# Patient Record
Sex: MALE | Race: WHITE | NOT HISPANIC OR LATINO | Employment: FULL TIME | URBAN - METROPOLITAN AREA
[De-identification: names, ages, dates, MRNs, and addresses within clinical notes are randomized per-mention and may not be internally consistent; named-entity substitution may affect disease eponyms.]

---

## 2017-09-08 ENCOUNTER — ALLSCRIPTS OFFICE VISIT (OUTPATIENT)
Dept: OTHER | Facility: OTHER | Age: 67
End: 2017-09-08

## 2017-09-08 ENCOUNTER — APPOINTMENT (OUTPATIENT)
Dept: LAB | Facility: CLINIC | Age: 67
End: 2017-09-08
Payer: MEDICARE

## 2017-09-08 DIAGNOSIS — Z13.1 ENCOUNTER FOR SCREENING FOR DIABETES MELLITUS: ICD-10-CM

## 2017-09-08 DIAGNOSIS — Z13.6 ENCOUNTER FOR SCREENING FOR CARDIOVASCULAR DISORDERS: ICD-10-CM

## 2017-09-08 DIAGNOSIS — J30.9 ALLERGIC RHINITIS: ICD-10-CM

## 2017-09-08 LAB
CHOLEST SERPL-MCNC: 230 MG/DL (ref 50–200)
GLUCOSE P FAST SERPL-MCNC: 88 MG/DL (ref 65–99)
HDLC SERPL-MCNC: 32 MG/DL (ref 40–60)
LDLC SERPL CALC-MCNC: 160 MG/DL (ref 0–100)
TRIGL SERPL-MCNC: 189 MG/DL

## 2017-09-08 PROCEDURE — 86003 ALLG SPEC IGE CRUDE XTRC EA: CPT

## 2017-09-08 PROCEDURE — 82947 ASSAY GLUCOSE BLOOD QUANT: CPT

## 2017-09-08 PROCEDURE — 36415 COLL VENOUS BLD VENIPUNCTURE: CPT

## 2017-09-08 PROCEDURE — 80061 LIPID PANEL: CPT

## 2017-09-08 PROCEDURE — 82785 ASSAY OF IGE: CPT

## 2017-09-10 ENCOUNTER — GENERIC CONVERSION - ENCOUNTER (OUTPATIENT)
Dept: OTHER | Facility: OTHER | Age: 67
End: 2017-09-10

## 2017-09-11 LAB
A ALTERNATA IGE QN: 1.25 KUA/I
A FUMIGATUS IGE QN: <0.1 KUA/I
ALLERGEN COMMENT: ABNORMAL
ALLERGEN COMMENT: ABNORMAL
ALMOND IGE QN: <0.1 KUA/I
BERMUDA GRASS IGE QN: <0.1 KUA/I
BOXELDER IGE QN: <0.1 KUA/I
C HERBARUM IGE QN: <0.1 KUA/I
CASHEW NUT IGE QN: <0.1 KUA/I
CAT DANDER IGE QN: 1.24 KUA/I
CMN PIGWEED IGE QN: <0.1 KUA/I
CODFISH IGE QN: <0.1 KUA/I
COMMON RAGWEED IGE QN: 1 KUA/I
COTTONWOOD IGE QN: <0.1 KUA/I
D FARINAE IGE QN: 1.18 KUA/I
D PTERONYSS IGE QN: 1.49 KUA/I
DOG DANDER IGE QN: 0.43 KUA/I
EGG WHITE IGE QN: <0.1 KUA/I
GLUTEN IGE QN: <0.1 KUA/I
HAZELNUT IGE QN: <0.1 KUA/L
LONDON PLANE IGE QN: <0.1 KUA/I
MILK IGE QN: <0.1 KUA/I
MOUSE URINE PROT IGE QN: <0.1 KUA/I
MT JUNIPER IGE QN: <0.1 KUA/I
MUGWORT IGE QN: <0.1 KUA/I
P NOTATUM IGE QN: <0.1 KUA/I
PEANUT IGE QN: <0.1 KUA/I
ROACH IGE QN: 0.38 KUA/I
SALMON IGE QN: <0.1 KUA/I
SCALLOP IGE QN: <0.1 KUA/L
SESAME SEED IGE QN: 0.13 KUA/I
SHEEP SORREL IGE QN: <0.1 KUA/I
SHRIMP IGE QN: 0.39 KUA/L
SILVER BIRCH IGE QN: <0.1 KUA/I
SOYBEAN IGE QN: <0.1 KUA/I
TIMOTHY IGE QN: 0.86 KUA/I
TOTAL IGE SMQN RAST: 115 KU/L (ref 0–113)
TOTAL IGE SMQN RAST: 115 KU/L (ref 0–113)
TUNA IGE QN: <0.1 KUA/I
WALNUT IGE QN: 0.15 KUA/I
WALNUT IGE QN: <0.1 KUA/I
WHEAT IGE QN: <0.1 KUA/I
WHITE ASH IGE QN: <0.1 KUA/I
WHITE ELM IGE QN: <0.1 KUA/I
WHITE MULBERRY IGE QN: <0.1 KUA/I
WHITE OAK IGE QN: <0.1 KUA/I

## 2017-09-26 ENCOUNTER — ALLSCRIPTS OFFICE VISIT (OUTPATIENT)
Dept: OTHER | Facility: OTHER | Age: 67
End: 2017-09-26

## 2018-01-14 VITALS
WEIGHT: 225 LBS | OXYGEN SATURATION: 97 % | DIASTOLIC BLOOD PRESSURE: 70 MMHG | HEART RATE: 81 BPM | BODY MASS INDEX: 32.21 KG/M2 | HEIGHT: 70 IN | SYSTOLIC BLOOD PRESSURE: 116 MMHG

## 2018-01-14 VITALS
BODY MASS INDEX: 32.21 KG/M2 | OXYGEN SATURATION: 94 % | HEART RATE: 66 BPM | SYSTOLIC BLOOD PRESSURE: 140 MMHG | HEIGHT: 70 IN | WEIGHT: 225 LBS | DIASTOLIC BLOOD PRESSURE: 82 MMHG

## 2018-01-15 NOTE — PROGRESS NOTES
Assessment    1  Medicare annual wellness visit, initial (V70 0) (Z00 00)   2  Hyperlipidemia (272 4) (E78 5)    Plan  Health Maintenance    · *VB - Urinary Incontinence Screen (Dx Z13 89 Screen for UI); Status:Complete -  Retrospective By Protocol Authorization;   Done: [id number] 08:42AM  Hyperlipidemia    · (1) LIPID PANEL, FASTING; Status:Active; Requested for:26Mar2018;    · Follow-up visit in 6 months Evaluation and Treatment  Follow-up Dr Vince Byrd  Status: Ankita Whyte for: [id number]   · Eat a low fat and low cholesterol diet ; Status:Complete;   Done: 54FYH1855  Medicare annual wellness visit, initial    · Follow-up visit in 1 year Evaluation and Treatment  Follow-up  Status: Hold For -  Scheduling  Requested for: [id number]  Screening for malignant neoplasm of prostate    · (1) PSA (SCREEN) (Dx V76 44 Screen for Prostate Cancer); Status:Active; Requested  for:26Mar2018; Discussion/Summary    5 Wishes given to Pt to review  Advised use of Nasacort 1 spray each nostril twice daily  Can use Airborne or Zycam daily  Impression: Initial Annual Wellness Visit  Cardiovascular screening and counseling: the risks and benefits of screening were discussed and screening is current  Diabetes screening and counseling: the risks and benefits of screening were discussed and screening is current  Colorectal cancer screening and counseling: the risks and benefits of screening were discussed and due for a colonoscopy (low risk)  Prostate cancer screening and counseling: the risks and benefits of screening were discussed and screening is current  Osteoporosis screening and counseling: the risks and benefits of screening were discussed  Glaucoma screening and counseling: the risks and benefits of screening were discussed and screening is current   Immunizations: the risks and benefits of influenza vaccination were discussed with the patient, influenza vaccination is recommended annually, the risks and benefits of the Zostavax vaccine were discussed with the patient and the risks and benefits of the Td vaccine were discussed with the patient  Advance Directive Planning: paperwork and instructions were given to the patient  Patient Discussion: plan discussed with the patient, follow-up visit needed in one year  Self Referrals: No      History of Present Illness  48-year-old white male presents for his first Medicare annual wellness visit  She has been reviewed, clarified, and updated with the patient  She does have some allergy concerns with itchy watery eyes and runny nose  Labs done at his last visit were reviewed with him today  He does have significant allergies from the environment which are out now  He also has hyperlipidemia  We discussed low-cholesterol diet, and modifying some of the foods that he is presently eating  He would like to try for the next 6 months modifying his diet and repeating his labs before considering medication  The patient is being seen for the initial annual wellness visit  Medicare Screening and Risk Factors   Hospitalizations: no previous hospitalizations  Medicare Screening Tests Risk Questions   Drug and Alcohol Use: The patient is a current cigarette smoker  The patient reports never drinking alcohol  Alcohol concern:   The patient has no concerns about alcohol abuse  He has never used illicit drugs  Diet and Physical Activity: Current diet includes 1 servings of fruit per day and 1 servings of meat per day  The patient does not exercise  Mood Disorder and Cognitive Impairment Screening:   Cognitive impairment screening: denies difficulty learning/retaining new information, denies difficulty handling complex tasks, denies difficulty with reasoning, denies difficulty with spatial ability and orientation, denies difficulty with language and denies difficulty with behavior  Functional Ability/Level of Safety: He denies hearing difficulties   Activities of daily living details: meal preparation help needed and needs help doing laundry, but does not need help using the phone, no transportation help needed, does not need help shopping, does not need help managing medications and does not need help managing money  Fall risk factors: The patient fell 0 times in the past 12 months  Home safety risk factors:  no handrails on the stairs, but no unfamiliar surroundings, no loose rugs, no poor household lighting, no uneven floors, no household clutter and grab bars in the bathroom  Advance Directives: Advance directives: no living will, no durable power of  for health care directives and no advance directives  end of life decisions were reviewed with the patient  Co-Managers and Medical Equipment/Suppliers: See Patient Care Team   Falls Risk: The patient fell times in the past 12 months  The patient is currently asymptomatic Symptoms Include: The patient currently has no urinary incontinence symptoms  Review of Systems    Constitutional: no fever, no chills, no malaise and no fatigue  Head and Face: no facial pain and no facial pressure  Eyes: watery discharge from the eyes and itching of the eyes  ENT: nasal congestion, nasal discharge and sneezing, but no sore throat, no scratchy throat, no hoarseness, no earache, no hearing loss and no white patches in the mouth  Cardiovascular: negative  Respiratory: negative  Gastrointestinal: no abdominal pain, no nausea, no vomiting, no diarrhea, no constipation and no bright red blood per rectum  Genitourinary: no dysuria, no urinary frequency, no urinary urgency, no urinary incontinence and no urinary hesitancy  Musculoskeletal: joint stiffness, but no diffuse joint pain and no generalized muscle aches  Integumentary and Breasts: no rashes  Neurological: no headache, no dizziness and no fainting     Psychiatric: no anxiety, no depression and not suicidal    Hematologic and Lymphatic: negative  Active Problems    1  Allergic rhinitis (477 9) (J30 9)   2  Colon cancer screening (V76 51) (Z12 11)   3  Screening for cardiovascular condition (V81 2) (Z13 6)   4  Screening for diabetes mellitus (V77 1) (Z13 1)   5  Tobacco abuse (305 1) (Z72 0)    Past Medical History    1  Denied: History of mental disorder    The active problems and past medical history were reviewed and updated today  Surgical History    1  History of Gallbladder Surgery    The surgical history was reviewed and updated today  Family History  Mother    1  Family history of malignant neoplasm of breast (V16 3) (Z80 3)  Father    2  Family history of emphysema (V17 6) (Z82 5)  Family History    3  Denied: Family history of mental disorder   4  Denied: Family history of substance abuse    The family history was reviewed and updated today  Social History    · Current every day smoker (305 1) (F17 200)   · Does not use illicit drugs (Z81 35) (Z78 9)   · Full-time employment   · Denied: History of High risk sexual behavior   ·    · No alcohol use  The social history was reviewed and updated today  The social history was reviewed and is unchanged  Current Meds   1  Claritin TABS; TAKE 1 TABLET DAILY; Therapy: (Recorded:44Sjn1580) to Recorded   2  Nasacort Allergy 24HR 55 MCG/ACT Nasal Aerosol; Therapy: (Recorded:08Sep2017) to Recorded   3  Olopatadine HCl - 0 1 % Ophthalmic Solution; APPLY 1 DROP Daily; Therapy: (Recorded:08Sep2017) to Recorded   4  Triamcinolone Acetonide 0 1 % External Cream;   Therapy: (Recorded:08Sep2017) to Recorded    The medication list was reviewed and updated today  Allergies    1  Penicillins    2   Other    Vitals  Signs   Recorded: 39DMT6261 08:33AM   Heart Rate: 66  Systolic: 538  Diastolic: 82  Height: 5 ft 9 6 in  Weight: 225 lb   BMI Calculated: 32 66  BSA Calculated: 2 19  O2 Saturation: 94    Results/Data  Falls Risk Assessment (Dx Z13 89 Screen for Neurologic Disorder) [id number] 08:42AM User, Ahs     Test Name Result Flag Reference   Falls Risk      No falls in the past year     PHQ-2 Adult Depression Screening 67Uyi5496 08:37AM User, Ahs     Test Name Result Flag Reference   PHQ-2 Adult Depression Score 0     Over the last two weeks, how often have you been bothered by any of the following problems?   Little interest or pleasure in doing things: Not at all - 0  Feeling down, depressed, or hopeless: Not at all - 0   PHQ-2 Adult Depression Screening Negative         Signatures   Electronically signed by : Felix Cline, [facility]; Sep 26 2017  9:14AM EST                       (Author)    Electronically signed by : Gemma Coburn MD; Sep 26 2017  9:57AM EST

## 2018-01-16 NOTE — RESULT NOTES
Verified Results  (1) GLUCOSE,  FASTING 50Vyl9821 10:16AM Odilon De León     Test Name Result Flag Reference   GLUCOSE FASTING 88 mg/dL  65-99   Specimen collection should occur prior to Sulfasalazine administration due to the potential for falsely depressed results  Specimen collection should occur prior to Sulfapyridine administration due to the potential for falsely elevated results  (1) LIPID PANEL, FASTING 41Leo0867 10:16AM Odilon De León     Test Name Result Flag Reference   CHOLESTEROL 230 mg/dL H    HDL,DIRECT 32 mg/dL L 40-60   Specimen collection should occur prior to Metamizole administration due to the potential for falsley depressed results  LDL CHOLESTEROL CALCULATED 160 mg/dL H 0-100   Triglyceride:        Normal ??? ??? ??? ??? ??? ??? ??? <150 mg/dl   ??? ??? ???Borderline High ??? ??? 150-199 mg/dl   ??? ??? ? ?? High ??? ??? ??? ??? ??? ??? ??? 200-499 mg/dl   ??? ??? ? ??Very High ??? ??? ??? ??? ??? >499 mg/dl      Cholesterol:       Desirable ??? ??? ??? ??? <200 mg/dl   ??? ??? Borderline High ??? 200-239 mg/dl   ??? ??? High ??? ??? ??? ??? ??? ??? >239 mg/dl      HDL Cholesterol:       High ??? ???>59 mg/dL   ??? ??? Low ??? ??? <41 mg/dL      This screening LDL is a calculated result  It does not have the accuracy of the Direct Measured LDL in the monitoring of patients with hyperlipidemia and/or statin therapy  Direct Measure LDL (KLW515) must be ordered separately in these patients  TRIGLYCERIDES 189 mg/dL H <=150   Specimen collection should occur prior to N-Acetylcysteine or Metamizole administration due to the potential for falsely depressed results

## 2018-03-26 DIAGNOSIS — Z12.5 ENCOUNTER FOR SCREENING FOR MALIGNANT NEOPLASM OF PROSTATE: ICD-10-CM

## 2018-03-26 DIAGNOSIS — E78.5 HYPERLIPIDEMIA: ICD-10-CM

## 2018-06-21 ENCOUNTER — OFFICE VISIT (OUTPATIENT)
Dept: INTERNAL MEDICINE CLINIC | Facility: CLINIC | Age: 68
End: 2018-06-21
Payer: MEDICARE

## 2018-06-21 VITALS
WEIGHT: 218.4 LBS | BODY MASS INDEX: 29.58 KG/M2 | RESPIRATION RATE: 20 BRPM | HEIGHT: 72 IN | HEART RATE: 72 BPM | OXYGEN SATURATION: 93 % | DIASTOLIC BLOOD PRESSURE: 88 MMHG | SYSTOLIC BLOOD PRESSURE: 132 MMHG | TEMPERATURE: 96.2 F

## 2018-06-21 DIAGNOSIS — J20.9 ACUTE BRONCHITIS WITH BRONCHOSPASM: Primary | ICD-10-CM

## 2018-06-21 DIAGNOSIS — L25.9 CONTACT DERMATITIS, UNSPECIFIED CONTACT DERMATITIS TYPE, UNSPECIFIED TRIGGER: ICD-10-CM

## 2018-06-21 PROBLEM — Z72.0 TOBACCO ABUSE: Status: ACTIVE | Noted: 2017-09-08

## 2018-06-21 PROBLEM — E78.5 HYPERLIPIDEMIA: Status: ACTIVE | Noted: 2017-09-26

## 2018-06-21 PROBLEM — J30.9 ALLERGIC RHINITIS: Status: ACTIVE | Noted: 2017-09-08

## 2018-06-21 PROCEDURE — 99213 OFFICE O/P EST LOW 20 MIN: CPT | Performed by: PHYSICIAN ASSISTANT

## 2018-06-21 RX ORDER — AZELASTINE HYDROCHLORIDE 0.5 MG/ML
SOLUTION/ DROPS OPHTHALMIC
Refills: 0 | COMMUNITY
Start: 2018-05-23

## 2018-06-21 RX ORDER — BENZONATATE 100 MG/1
100 CAPSULE ORAL 3 TIMES DAILY PRN
Qty: 30 CAPSULE | Refills: 0 | Status: SHIPPED | OUTPATIENT
Start: 2018-06-21 | End: 2018-11-13 | Stop reason: SDUPTHER

## 2018-06-21 RX ORDER — IBUPROFEN 200 MG
TABLET ORAL
COMMUNITY

## 2018-06-21 RX ORDER — LORATADINE 10 MG/1
CAPSULE, LIQUID FILLED ORAL
COMMUNITY

## 2018-06-21 RX ORDER — METHYLPREDNISOLONE 4 MG/1
TABLET ORAL
Qty: 21 TABLET | Refills: 0 | Status: SHIPPED | OUTPATIENT
Start: 2018-06-21 | End: 2018-11-13 | Stop reason: SDUPTHER

## 2018-06-21 RX ORDER — TRIAMCINOLONE ACETONIDE 5 MG/G
CREAM TOPICAL 3 TIMES DAILY
Qty: 30 G | Refills: 0 | Status: SHIPPED | OUTPATIENT
Start: 2018-06-21

## 2018-06-21 RX ORDER — DOXYCYCLINE HYCLATE 100 MG/1
100 CAPSULE ORAL EVERY 12 HOURS SCHEDULED
Qty: 20 CAPSULE | Refills: 0 | Status: SHIPPED | OUTPATIENT
Start: 2018-06-21 | End: 2018-07-01

## 2018-06-21 RX ORDER — TRIAMCINOLONE ACETONIDE 1 MG/G
CREAM TOPICAL
COMMUNITY
End: 2018-06-21 | Stop reason: ALTCHOICE

## 2018-06-21 NOTE — PROGRESS NOTES
Assessment/Plan:   Patient Instructions   Rest, increase fluids, Tylenol for fever or aches as per package instructions  Gargle with warm salt water 3-4 times daily for comfort  Start and finish the antibiotic  Use cough Perles as needed for coughing  Have Medrol filled only if the wheezing continues  Otherwise no need to fill  Use topical cream as needed for outbreaks of rash on legs, use twice daily and rub in well  Return for Next scheduled follow up  Diagnoses and all orders for this visit:    Acute bronchitis with bronchospasm  -     doxycycline hyclate (VIBRAMYCIN) 100 mg capsule; Take 1 capsule (100 mg total) by mouth every 12 (twelve) hours for 10 days  -     benzonatate (TESSALON PERLES) 100 mg capsule; Take 1 capsule (100 mg total) by mouth 3 (three) times a day as needed for cough  -     Methylprednisolone 4 MG TBPK; Use as directed on package    Contact dermatitis, unspecified contact dermatitis type, unspecified trigger  -     triamcinolone (KENALOG) 0 5 % cream; Apply topically 3 (three) times a day    Other orders  -     azelastine (OPTIVAR) 0 05 % ophthalmic solution; instill 1 drop into both eyes twice a day  -     ibuprofen (MOTRIN) 200 mg tablet; 1 tab(s)  -     Loratadine 10 MG CAPS;   -     Discontinue: triamcinolone (KENALOG) 0 1 % cream; Apply topically          Subjective:      Patient ID: Gregorio Santos is a 70 y o  male  Acute visit    Patient states he and his wife or passing something around for the past 3 weeks  Over the past week he started with a cough that is productive of sputum however he does not look at the color  He denies head congestion, runny nose, sneezing, itchy watery eyes  Does not feel any postnasal drip  Although he does admit he has a history of allergies but he does take care them by using loratadine and eyedrops, he feels this controls his symptoms  Yesterday started with a sore throat, no earaches    Of note also is patient does smoke 10 cigarettes daily  Patient also was starting to break out in a rash on his legs that is itchy and then seems to spread  In the past he had been given a topical cream that did help to some degree  ALLERGIES:  Allergies   Allergen Reactions    Penicillin G Anaphylaxis       CURRENT MEDICATIONS:    Current Outpatient Prescriptions:     azelastine (OPTIVAR) 0 05 % ophthalmic solution, instill 1 drop into both eyes twice a day, Disp: , Rfl: 0    ibuprofen (MOTRIN) 200 mg tablet, 1 tab(s), Disp: , Rfl:     Loratadine 10 MG CAPS, , Disp: , Rfl:     benzonatate (TESSALON PERLES) 100 mg capsule, Take 1 capsule (100 mg total) by mouth 3 (three) times a day as needed for cough, Disp: 30 capsule, Rfl: 0    doxycycline hyclate (VIBRAMYCIN) 100 mg capsule, Take 1 capsule (100 mg total) by mouth every 12 (twelve) hours for 10 days, Disp: 20 capsule, Rfl: 0    Methylprednisolone 4 MG TBPK, Use as directed on package, Disp: 21 tablet, Rfl: 0    triamcinolone (KENALOG) 0 5 % cream, Apply topically 3 (three) times a day, Disp: 30 g, Rfl: 0    ACTIVE PROBLEM LIST:  Patient Active Problem List   Diagnosis    Acute bronchitis with bronchospasm    Allergic rhinitis    Hyperlipidemia    Tobacco abuse    Contact dermatitis       PAST MEDICAL HISTORY:  No past medical history on file  PAST SURGICAL HISTORY:  No past surgical history on file  FAMILY HISTORY:  No family history on file  SOCIAL HISTORY:  Social History     Social History    Marital status: /Civil Union     Spouse name: N/A    Number of children: N/A    Years of education: N/A     Occupational History    Not on file       Social History Main Topics    Smoking status: Current Every Day Smoker     Years: 50 00    Smokeless tobacco: Never Used      Comment: 10 Cigarretts per day    Alcohol use Not on file    Drug use: No    Sexual activity: No     Other Topics Concern    Not on file     Social History Narrative    No narrative on file       Review of Systems   Constitutional: Negative for activity change, chills, fatigue and fever  HENT: Positive for sore throat and trouble swallowing  Negative for congestion, postnasal drip, rhinorrhea, sinus pain and sneezing  Eyes: Negative for discharge, redness and itching  Respiratory: Positive for cough  Negative for chest tightness and shortness of breath  Cardiovascular: Negative for chest pain, palpitations and leg swelling  Gastrointestinal: Negative for abdominal pain  Genitourinary: Negative for difficulty urinating  Musculoskeletal: Negative for arthralgias and myalgias  Skin: Negative for rash  Allergic/Immunologic: Negative for immunocompromised state  Neurological: Negative for dizziness, syncope, weakness, light-headedness and headaches  Hematological: Negative for adenopathy  Does not bruise/bleed easily  Psychiatric/Behavioral: Negative for dysphoric mood  The patient is not nervous/anxious  Objective:  Vitals:    06/21/18 0955   BP: 132/88   BP Location: Left arm   Patient Position: Sitting   Cuff Size: Adult   Pulse: 72   Resp: 20   Temp: (!) 96 2 °F (35 7 °C)   TempSrc: Temporal   SpO2: 93%   Weight: 99 1 kg (218 lb 6 4 oz)   Height: 6' (1 829 m)        Physical Exam   Constitutional: He is oriented to person, place, and time  He appears well-developed and well-nourished  No distress  HENT:   Erythema of the posterior pharynx is present, tonsils are not swollen, there is no exudate  Yellowish postnasal drainage is present  HEENT otherwise unremarkable  Eyes: Conjunctivae are normal    Neck: Neck supple  Cardiovascular: Normal rate, regular rhythm and normal heart sounds  Pulmonary/Chest: Effort normal  He has wheezes (LateInspiratory wheezing is present in the anterior fields, forced expiration does cause wheezing in anterior fields and upper bases and stimulates coughing  )  He has no rales  Musculoskeletal: He exhibits no edema  Lymphadenopathy:     He has no cervical adenopathy  Neurological: He is alert and oriented to person, place, and time  Skin: Skin is warm and dry  No rash noted  Psychiatric: He has a normal mood and affect  His behavior is normal    Nursing note and vitals reviewed  RESULTS:    No results found for this or any previous visit (from the past 1008 hour(s))  This note was created with voice recognition software  Phonic, grammatical and spelling errors may be present within the note as a result

## 2018-06-21 NOTE — PATIENT INSTRUCTIONS
Rest, increase fluids, Tylenol for fever or aches as per package instructions  Gargle with warm salt water 3-4 times daily for comfort  Start and finish the antibiotic  Use cough Perles as needed for coughing  Have Medrol filled only if the wheezing continues  Otherwise no need to fill  Use topical cream as needed for outbreaks of rash on legs, use twice daily and rub in well

## 2018-11-13 ENCOUNTER — OFFICE VISIT (OUTPATIENT)
Dept: INTERNAL MEDICINE CLINIC | Facility: CLINIC | Age: 68
End: 2018-11-13
Payer: MEDICARE

## 2018-11-13 VITALS
DIASTOLIC BLOOD PRESSURE: 88 MMHG | TEMPERATURE: 98.7 F | WEIGHT: 227 LBS | BODY MASS INDEX: 30.75 KG/M2 | HEART RATE: 81 BPM | HEIGHT: 72 IN | OXYGEN SATURATION: 93 % | SYSTOLIC BLOOD PRESSURE: 128 MMHG

## 2018-11-13 DIAGNOSIS — N52.9 ERECTILE DYSFUNCTION, UNSPECIFIED ERECTILE DYSFUNCTION TYPE: ICD-10-CM

## 2018-11-13 DIAGNOSIS — E66.9 OBESITY (BMI 30-39.9): ICD-10-CM

## 2018-11-13 DIAGNOSIS — J20.9 ACUTE BRONCHITIS WITH BRONCHOSPASM: Primary | ICD-10-CM

## 2018-11-13 DIAGNOSIS — J30.9 ALLERGIC RHINITIS, UNSPECIFIED SEASONALITY, UNSPECIFIED TRIGGER: ICD-10-CM

## 2018-11-13 PROCEDURE — 99213 OFFICE O/P EST LOW 20 MIN: CPT | Performed by: PHYSICIAN ASSISTANT

## 2018-11-13 RX ORDER — SILDENAFIL 100 MG/1
100 TABLET, FILM COATED ORAL DAILY PRN
Qty: 10 TABLET | Refills: 1 | Status: SHIPPED | OUTPATIENT
Start: 2018-11-13

## 2018-11-13 RX ORDER — BENZONATATE 100 MG/1
100 CAPSULE ORAL 3 TIMES DAILY PRN
Qty: 30 CAPSULE | Refills: 0 | Status: SHIPPED | OUTPATIENT
Start: 2018-11-13

## 2018-11-13 RX ORDER — DOXYCYCLINE HYCLATE 100 MG/1
100 CAPSULE ORAL EVERY 12 HOURS SCHEDULED
Qty: 28 CAPSULE | Refills: 0 | Status: SHIPPED | OUTPATIENT
Start: 2018-11-13 | End: 2018-11-27

## 2018-11-13 RX ORDER — METHYLPREDNISOLONE 4 MG/1
TABLET ORAL
Qty: 21 TABLET | Refills: 0 | Status: SHIPPED | OUTPATIENT
Start: 2018-11-13

## 2018-11-13 NOTE — PROGRESS NOTES
Assessment/Plan:   Patient Instructions   Rest, increase fluids, Tylenol for fever or aches as per package instructions  Start finish the antibiotic  Would recommend you take a probiotic daily or eat yogurt daily when on any antibiotic  Start Medrol pack intake as directed  Use cough medicine only as needed  Return for Alisa  Diagnoses and all orders for this visit:    Allergic rhinitis, unspecified seasonality, unspecified trigger    Acute bronchitis with bronchospasm  -     Methylprednisolone 4 MG TBPK; Use as directed on package  -     benzonatate (TESSALON PERLES) 100 mg capsule; Take 1 capsule (100 mg total) by mouth 3 (three) times a day as needed for cough  -     doxycycline hyclate (VIBRAMYCIN) 100 mg capsule; Take 1 capsule (100 mg total) by mouth every 12 (twelve) hours for 14 days    Erectile dysfunction, unspecified erectile dysfunction type  -     sildenafil (VIAGRA) 100 mg tablet; Take 1 tablet (100 mg total) by mouth daily as needed for erectile dysfunction    Obesity (BMI 30-39 9)  -     Ambulatory referral to Nutrition Services; Future          Subjective:      Patient ID: Porter Petersen is a 70 y o  male  Acute visit    Patient is stating that for over 10 days he has had the onset of a cough that is now productive of yellow-green sputum  He knows he has seasonal allergies which have been bothering him but he has been taking OTC loratadine  He has noted the onset of wheezing and some shortness of breath on exertion  No documented fever, no complaint of chills  He has had his flu immunization in October  He does admit to sneezing, runny nose and itchy watery eyes  ED:  Patient would like a renewal of Viagra  Patient is noted some daytime somnolence with a feeling of wanting to take a nap at around mid day    He does have a history of snoring, he admits a weight gain which she is convinced contributes, but he has not been told by his wife that he has any apneic episodes  He would like to sit down with the dietitian to discuss a weight reducing diet to start after the new year  ALLERGIES:  Allergies   Allergen Reactions    Penicillin G Anaphylaxis       CURRENT MEDICATIONS:    Current Outpatient Prescriptions:     azelastine (OPTIVAR) 0 05 % ophthalmic solution, instill 1 drop into both eyes twice a day, Disp: , Rfl: 0    benzonatate (TESSALON PERLES) 100 mg capsule, Take 1 capsule (100 mg total) by mouth 3 (three) times a day as needed for cough, Disp: 30 capsule, Rfl: 0    ibuprofen (MOTRIN) 200 mg tablet, 1 tab(s), Disp: , Rfl:     Loratadine 10 MG CAPS, , Disp: , Rfl:     doxycycline hyclate (VIBRAMYCIN) 100 mg capsule, Take 1 capsule (100 mg total) by mouth every 12 (twelve) hours for 14 days, Disp: 28 capsule, Rfl: 0    Methylprednisolone 4 MG TBPK, Use as directed on package, Disp: 21 tablet, Rfl: 0    sildenafil (VIAGRA) 100 mg tablet, Take 1 tablet (100 mg total) by mouth daily as needed for erectile dysfunction, Disp: 10 tablet, Rfl: 1    triamcinolone (KENALOG) 0 5 % cream, Apply topically 3 (three) times a day (Patient not taking: Reported on 11/13/2018 ), Disp: 30 g, Rfl: 0    ACTIVE PROBLEM LIST:  Patient Active Problem List   Diagnosis    Acute bronchitis with bronchospasm    Allergic rhinitis    Hyperlipidemia    Tobacco abuse    Contact dermatitis    Erectile dysfunction    Obesity (BMI 30-39  9)       PAST MEDICAL HISTORY:  No past medical history on file  PAST SURGICAL HISTORY:  No past surgical history on file  FAMILY HISTORY:  No family history on file  SOCIAL HISTORY:  Social History     Social History    Marital status: /Civil Union     Spouse name: N/A    Number of children: N/A    Years of education: N/A     Occupational History    Not on file       Social History Main Topics    Smoking status: Current Every Day Smoker     Years: 50 00    Smokeless tobacco: Never Used      Comment: 10 Cigarretts per day    Alcohol use Not on file    Drug use: No    Sexual activity: No     Other Topics Concern    Not on file     Social History Narrative    No narrative on file       Review of Systems   Constitutional: Positive for fatigue  Negative for activity change, chills and fever  HENT: Positive for congestion, postnasal drip, rhinorrhea and sneezing  Negative for sinus pain, sinus pressure, sore throat, tinnitus and voice change  Eyes: Positive for redness and itching  Negative for discharge and visual disturbance  Respiratory: Positive for cough, shortness of breath and wheezing  Negative for chest tightness  Cardiovascular: Negative for chest pain, palpitations and leg swelling  Gastrointestinal: Negative for abdominal pain  Genitourinary: Negative for difficulty urinating  Musculoskeletal: Negative for arthralgias and myalgias  Skin: Negative for rash  Allergic/Immunologic: Negative for immunocompromised state  Neurological: Negative for dizziness, syncope, weakness, light-headedness and headaches  Hematological: Negative for adenopathy  Does not bruise/bleed easily  Psychiatric/Behavioral: Negative for confusion, decreased concentration and dysphoric mood  The patient is not nervous/anxious  Objective:  Vitals:    11/13/18 0835   BP: 128/88   BP Location: Left arm   Patient Position: Sitting   Pulse: 81   Temp: 98 7 °F (37 1 °C)   SpO2: 93%   Weight: 103 kg (227 lb)   Height: 6' (1 829 m)        Physical Exam   Constitutional: He is oriented to person, place, and time  He appears well-developed and well-nourished  No distress  HENT:   HEENT-injected conjunctivae, TMs dull with fluid behind, nasal mucosa hyperemic with yellow/green mucoid drainage, injected red posterior pharynx with yellow/green post nasal drainage, no complaint of sinus tenderness   Neck: Neck supple  No JVD present  Carotid bruit is not present  No thyromegaly present     Cardiovascular: Normal rate, regular rhythm and normal heart sounds  Pulmonary/Chest: Effort normal  He has wheezes  He has no rales  He exhibits no tenderness  Scattered inspiratory rhonchi and wheezing in anterior fields and upper bases  There is partial clearing with coughing  Also noted expiratory wheezing in all fields which precipitate coughing  Musculoskeletal: He exhibits no edema  Lymphadenopathy:     He has no cervical adenopathy  Neurological: He is alert and oriented to person, place, and time  Skin: Skin is warm and dry  No rash noted  Psychiatric: He has a normal mood and affect  His behavior is normal    Nursing note and vitals reviewed  RESULTS:    No results found for this or any previous visit (from the past 1008 hour(s))  This note was created with voice recognition software  Phonic, grammatical and spelling errors may be present within the note as a result

## 2018-11-13 NOTE — LETTER
November 13, 2018     Patient: Porter Petersen   YOB: 1950   Date of Visit: 11/13/2018       To Whom it May Concern:    Porter Petersen is under my professional care  He was seen in my office on 11/13/2018  He may return to work on 11/14/18  If you have any questions or concerns, please don't hesitate to call           Sincerely,          Rivas Sarmiento PA-C        CC: No Recipients

## 2018-11-13 NOTE — PATIENT INSTRUCTIONS
Rest, increase fluids, Tylenol for fever or aches as per package instructions  Start finish the antibiotic  Would recommend you take a probiotic daily or eat yogurt daily when on any antibiotic  Start Medrol pack intake as directed  Use cough medicine only as needed

## 2019-01-16 ENCOUNTER — TELEPHONE (OUTPATIENT)
Dept: INTERNAL MEDICINE CLINIC | Facility: CLINIC | Age: 69
End: 2019-01-16

## 2019-01-16 ENCOUNTER — OFFICE VISIT (OUTPATIENT)
Dept: INTERNAL MEDICINE CLINIC | Facility: CLINIC | Age: 69
End: 2019-01-16
Payer: MEDICARE

## 2019-01-16 VITALS
OXYGEN SATURATION: 96 % | BODY MASS INDEX: 30.48 KG/M2 | HEIGHT: 72 IN | DIASTOLIC BLOOD PRESSURE: 82 MMHG | WEIGHT: 225 LBS | TEMPERATURE: 97.6 F | HEART RATE: 76 BPM | SYSTOLIC BLOOD PRESSURE: 130 MMHG

## 2019-01-16 DIAGNOSIS — K08.9: ICD-10-CM

## 2019-01-16 DIAGNOSIS — J01.00 ACUTE NON-RECURRENT MAXILLARY SINUSITIS: Primary | ICD-10-CM

## 2019-01-16 DIAGNOSIS — R20.0 RIGHT FACIAL NUMBNESS: ICD-10-CM

## 2019-01-16 PROBLEM — L25.9 CONTACT DERMATITIS: Status: RESOLVED | Noted: 2018-06-21 | Resolved: 2019-01-16

## 2019-01-16 PROBLEM — J20.9 ACUTE BRONCHITIS WITH BRONCHOSPASM: Status: RESOLVED | Noted: 2018-06-21 | Resolved: 2019-01-16

## 2019-01-16 PROCEDURE — 99213 OFFICE O/P EST LOW 20 MIN: CPT | Performed by: PHYSICIAN ASSISTANT

## 2019-01-16 RX ORDER — OXYMETAZOLINE HYDROCHLORIDE 0.05 G/100ML
2 SPRAY NASAL 2 TIMES DAILY
COMMUNITY

## 2019-01-16 RX ORDER — MOMETASONE FUROATE 50 UG/1
SPRAY, METERED NASAL
COMMUNITY

## 2019-01-16 RX ORDER — DOXYCYCLINE HYCLATE 100 MG
TABLET ORAL
Refills: 0 | COMMUNITY
Start: 2019-01-09

## 2019-01-16 RX ORDER — PREDNISONE 20 MG/1
TABLET ORAL
Refills: 0 | COMMUNITY
Start: 2019-01-11

## 2019-01-16 NOTE — PATIENT INSTRUCTIONS
Ongoing sinus infection is currently being treated adequately  I have concern for patient's upper maxilla where there is an apparent what looks to be a to coming through his gumline, causing inflammation  Therefore will refer to oral surgery for an opinion

## 2019-01-16 NOTE — TELEPHONE ENCOUNTER
Use topical warm moist compresses to the facial area  Let me know if swelling recurs, we may need to see you at that point

## 2019-01-16 NOTE — TELEPHONE ENCOUNTER
Pt came in for a apt, His R side of the face is swollen, he got prescribed a medication  He did schedule with a oral surgeon, his apt is Jan 28, 2019  He wants to know, If the swell on his face does not go down by the 28th? what should he do?

## 2019-01-16 NOTE — TELEPHONE ENCOUNTER
Patient was in this morning and tried to get an appt with Dr Chavez Service but they can not see him till 1/28  Patient will be out of his doxycycline on Sat and would like to know if you could send in another script to [address] [place] [address]? Please notify patient if or if not you will send in another  Script

## 2019-01-16 NOTE — PROGRESS NOTES
Assessment/Plan:   Patient Instructions   Ongoing sinus infection is currently being treated adequately  I have concern for patient's upper maxilla where there is an apparent what looks to be a to coming through his gumline, causing inflammation  Therefore will refer to oral surgery for an opinion  BMI Counseling: Body mass index is 30 52 kg/m²  Discussed the patient's BMI with him  The BMI is above average  BMI counseling and education was provided to the patient  Nutrition recommendations include reducing portion sizes and decreasing overall calorie intake  Exercise recommendations include exercising 3-5 times per week  Return for Next scheduled follow up  Diagnoses and all orders for this visit:    Acute non-recurrent maxillary sinusitis    Right facial numbness  -     Ambulatory referral to Oral Maxillofacial Surgery; Future    Tooth and supporting structure disorder  -     Ambulatory referral to Oral Maxillofacial Surgery; Future    Other orders  -     predniSONE 20 mg tablet; TAKE 2 TABLETS BY MOUTH EVERY DAY FOR 5 DAYS  -     mometasone (NASONEX) 50 mcg/act nasal spray;   -     doxycycline hyclate (VIBRA-TABS) 100 mg tablet; TAKE 1 TABLET BY MOUTH TWICE A DAY FOR 10 DAYS  -     oxymetazoline (AFRIN) 0 05 % nasal spray; 2 sprays by Each Nare route 2 (two) times a day          Subjective:      Patient ID: Alvaro Sellers is a 70 y o  male  Acute visit    Patient is in today as he had been seen on 2 occasions by urgent care  Initially at the end of December at which time he was diagnosed with a sinus infection and treated with a Z-Linden  Patient reports he had no improvement in symptoms, return to the urgent care at which time his antibiotic was changed to doxycycline  He generally has good results with the doxycycline controlling his symptoms  He describes his symptoms to me as initially that of a severe cold with sinus pressure and pain   This has significantly improved with his treatment from urgent care  He however now it has been left with a numbness from below his nose to his mid right cheek area  On further questioning patient did admit to me that he has had problems with his dentures for over 1 year  He has something that is sticking out, and he is wondering if this is causing some of the problem  ALLERGIES:  Allergies   Allergen Reactions    Penicillin G Anaphylaxis       CURRENT MEDICATIONS:    Current Outpatient Prescriptions:     azelastine (OPTIVAR) 0 05 % ophthalmic solution, instill 1 drop into both eyes twice a day, Disp: , Rfl: 0    benzonatate (TESSALON PERLES) 100 mg capsule, Take 1 capsule (100 mg total) by mouth 3 (three) times a day as needed for cough, Disp: 30 capsule, Rfl: 0    doxycycline hyclate (VIBRA-TABS) 100 mg tablet, TAKE 1 TABLET BY MOUTH TWICE A DAY FOR 10 DAYS, Disp: , Rfl: 0    ibuprofen (MOTRIN) 200 mg tablet, 1 tab(s), Disp: , Rfl:     Loratadine 10 MG CAPS, , Disp: , Rfl:     Methylprednisolone 4 MG TBPK, Use as directed on package, Disp: 21 tablet, Rfl: 0    mometasone (NASONEX) 50 mcg/act nasal spray, , Disp: , Rfl:     oxymetazoline (AFRIN) 0 05 % nasal spray, 2 sprays by Each Nare route 2 (two) times a day, Disp: , Rfl:     predniSONE 20 mg tablet, TAKE 2 TABLETS BY MOUTH EVERY DAY FOR 5 DAYS, Disp: , Rfl: 0    sildenafil (VIAGRA) 100 mg tablet, Take 1 tablet (100 mg total) by mouth daily as needed for erectile dysfunction, Disp: 10 tablet, Rfl: 1    triamcinolone (KENALOG) 0 5 % cream, Apply topically 3 (three) times a day, Disp: 30 g, Rfl: 0    ACTIVE PROBLEM LIST:  Patient Active Problem List   Diagnosis    Allergic rhinitis    Hyperlipidemia    Tobacco abuse    Erectile dysfunction    Obesity (BMI 30-39  9)    Acute non-recurrent maxillary sinusitis    Right facial numbness    Tooth and supporting structure disorder       PAST MEDICAL HISTORY:  No past medical history on file      PAST SURGICAL HISTORY:  Past Surgical History:   Procedure Laterality Date    GALLBLADDER SURGERY         FAMILY HISTORY:  Family History   Problem Relation Age of Onset    Breast cancer Mother     Emphysema Father        SOCIAL HISTORY:  Social History     Social History    Marital status: /Civil Union     Spouse name: N/A    Number of children: N/A    Years of education: N/A     Occupational History          full-time employment     Social History Main Topics    Smoking status: Current Every Day Smoker     Years: 50 00    Smokeless tobacco: Never Used      Comment: 10 Cigarretts per day    Alcohol use No    Drug use: No    Sexual activity: No     Other Topics Concern    Not on file     Social History Narrative    No narrative on file       Review of Systems   Constitutional: Negative for activity change, chills, fatigue and fever  HENT: Positive for congestion  Eyes: Negative for discharge  Respiratory: Negative for cough, chest tightness and shortness of breath  Cardiovascular: Negative for chest pain, palpitations and leg swelling  Gastrointestinal: Negative for abdominal pain  Genitourinary: Negative for difficulty urinating  Musculoskeletal: Negative for arthralgias and myalgias  Skin: Negative for rash  Allergic/Immunologic: Negative for immunocompromised state  Neurological: Positive for numbness  Negative for dizziness, syncope, weakness, light-headedness and headaches  Hematological: Negative for adenopathy  Does not bruise/bleed easily  Psychiatric/Behavioral: Negative for dysphoric mood  The patient is not nervous/anxious  Objective:  Vitals:    01/16/19 0836   BP: 130/82   BP Location: Left arm   Patient Position: Sitting   Pulse: 76   Temp: 97 6 °F (36 4 °C)   TempSrc: Oral   SpO2: 96%   Weight: 102 kg (225 lb)   Height: 6' (1 829 m)     Body mass index is 30 52 kg/m²  Physical Exam   Constitutional: He is oriented to person, place, and time   He appears well-developed and well-nourished  No distress  Smell like cigarette smoke   HENT:   HEENT unremarkable with exception that when patient removed his upper denture he has on the right upper maxilla area what looks to be a tooth sticking out that looks necrotic, that is surrounded by erythematous gingiva with a small pustule and bleb  Neck: Neck supple  Cardiovascular: Normal rate, regular rhythm and normal heart sounds  Pulmonary/Chest: Effort normal and breath sounds normal    Musculoskeletal: He exhibits no edema  Lymphadenopathy:     He has no cervical adenopathy  Neurological: He is alert and oriented to person, place, and time  Skin: Skin is warm and dry  No rash noted  Psychiatric: He has a normal mood and affect  His behavior is normal    Nursing note and vitals reviewed  RESULTS:    No results found for this or any previous visit (from the past 1008 hour(s))  This note was created with voice recognition software  Phonic, grammatical and spelling errors may be present within the note as a result

## 2019-01-17 NOTE — TELEPHONE ENCOUNTER
Spoke to patient and informed him that he should finish the doxy and see dentist on 1/28  I advised him that he should be fine until then

## 2019-01-28 ENCOUNTER — TELEPHONE (OUTPATIENT)
Dept: NEUROLOGY | Facility: CLINIC | Age: 69
End: 2019-01-28

## 2019-01-28 NOTE — TELEPHONE ENCOUNTER
Scheduled NP appt 2/21/19 with Dr Leelee Cason in [facility]  Sreedhar Yancey/Numbness in Face/Medicare/[employer]  Mailed NP paperwork to pt's home  States never seen by Neuro in past     Advised pt we will need Physicians order before appt, and if not we will need to cx appt  Also advised if the diagnosis on the order is different we may need to reschedule with a different provider

## 2019-01-30 ENCOUNTER — TELEPHONE (OUTPATIENT)
Dept: INTERNAL MEDICINE CLINIC | Facility: CLINIC | Age: 69
End: 2019-01-30

## 2019-01-30 DIAGNOSIS — R20.0 RIGHT FACIAL NUMBNESS: Primary | ICD-10-CM

## 2019-01-30 NOTE — TELEPHONE ENCOUNTER
Mee from [facility] called asking to please put a Referral for pt with Diagnosis - Numbness of the face in Epic

## 2019-01-30 NOTE — TELEPHONE ENCOUNTER
Jonelle Schiller Dr [place] office, [facility] advising them we need a Physicians order to be put in EPIC with 's name and diagnosis

## 2020-06-15 ENCOUNTER — TELEPHONE (OUTPATIENT)
Dept: INTERNAL MEDICINE CLINIC | Facility: CLINIC | Age: 70
End: 2020-06-15

## 2021-04-08 DIAGNOSIS — Z23 ENCOUNTER FOR IMMUNIZATION: ICD-10-CM

## 2023-03-21 ENCOUNTER — TELEPHONE (OUTPATIENT)
Dept: INTERNAL MEDICINE CLINIC | Facility: CLINIC | Age: 73
End: 2023-03-21

## 2023-03-21 NOTE — TELEPHONE ENCOUNTER
This patient is now under the care of   [address]   [address]   [place], [address] [facility]   [phone]   [id number]    [place], [address] [place] [address]   [phone] [facility]   [phone] (Fax)       he will no longer be using out office for primary care   Please end PCP

## 2023-03-30 NOTE — TELEPHONE ENCOUNTER
03/30/23 2:22 PM     The office's request has been received, reviewed, and the patient chart updated  The PCP has successfully been removed with a patient attribution note  This message will now be completed      Thank you  Rosi Kwan